# Patient Record
Sex: MALE | Race: BLACK OR AFRICAN AMERICAN
[De-identification: names, ages, dates, MRNs, and addresses within clinical notes are randomized per-mention and may not be internally consistent; named-entity substitution may affect disease eponyms.]

---

## 2019-03-01 ENCOUNTER — HOSPITAL ENCOUNTER (EMERGENCY)
Dept: HOSPITAL 72 - EMR | Age: 69
LOS: 1 days | Discharge: OTHER INSTITUTION | End: 2019-03-02
Payer: COMMERCIAL

## 2019-03-01 VITALS — DIASTOLIC BLOOD PRESSURE: 87 MMHG | SYSTOLIC BLOOD PRESSURE: 146 MMHG

## 2019-03-01 VITALS — SYSTOLIC BLOOD PRESSURE: 142 MMHG | DIASTOLIC BLOOD PRESSURE: 74 MMHG

## 2019-03-01 VITALS — DIASTOLIC BLOOD PRESSURE: 78 MMHG | SYSTOLIC BLOOD PRESSURE: 136 MMHG

## 2019-03-01 VITALS — BODY MASS INDEX: 25.9 KG/M2 | HEIGHT: 71 IN | WEIGHT: 185 LBS

## 2019-03-01 VITALS — SYSTOLIC BLOOD PRESSURE: 136 MMHG | DIASTOLIC BLOOD PRESSURE: 87 MMHG

## 2019-03-01 DIAGNOSIS — R42: ICD-10-CM

## 2019-03-01 DIAGNOSIS — R00.1: ICD-10-CM

## 2019-03-01 DIAGNOSIS — I10: ICD-10-CM

## 2019-03-01 DIAGNOSIS — R55: Primary | ICD-10-CM

## 2019-03-01 LAB
ADD MANUAL DIFF: NO
ALBUMIN SERPL-MCNC: 3.7 G/DL (ref 3.4–5)
ALBUMIN/GLOB SERPL: 1 {RATIO} (ref 1–2.7)
ALP SERPL-CCNC: 63 U/L (ref 46–116)
ALT SERPL-CCNC: 20 U/L (ref 12–78)
ANION GAP SERPL CALC-SCNC: 7 MMOL/L (ref 5–15)
APPEARANCE UR: CLEAR
APTT BLD: 27 SEC (ref 23–33)
APTT PPP: YELLOW S
AST SERPL-CCNC: 23 U/L (ref 15–37)
BASOPHILS NFR BLD AUTO: 1.7 % (ref 0–2)
BILIRUB DIRECT SERPL-MCNC: 0.2 MG/DL (ref 0–0.3)
BILIRUB SERPL-MCNC: 1.1 MG/DL (ref 0.2–1)
BUN SERPL-MCNC: 15 MG/DL (ref 7–18)
CALCIUM SERPL-MCNC: 9.2 MG/DL (ref 8.5–10.1)
CHLORIDE SERPL-SCNC: 103 MMOL/L (ref 98–107)
CO2 SERPL-SCNC: 31 MMOL/L (ref 21–32)
CREAT SERPL-MCNC: 1.2 MG/DL (ref 0.55–1.3)
EOSINOPHIL NFR BLD AUTO: 1.6 % (ref 0–3)
ERYTHROCYTE [DISTWIDTH] IN BLOOD BY AUTOMATED COUNT: 11.5 % (ref 11.6–14.8)
GLOBULIN SER-MCNC: 3.8 G/DL
GLUCOSE UR STRIP-MCNC: NEGATIVE MG/DL
HCT VFR BLD CALC: 43.1 % (ref 42–52)
HGB BLD-MCNC: 14.3 G/DL (ref 14.2–18)
INR PPP: 1 (ref 0.9–1.1)
KETONES UR QL STRIP: (no result)
LEUKOCYTE ESTERASE UR QL STRIP: (no result)
LYMPHOCYTES NFR BLD AUTO: 26.8 % (ref 20–45)
MCV RBC AUTO: 98 FL (ref 80–99)
MONOCYTES NFR BLD AUTO: 8.8 % (ref 1–10)
NEUTROPHILS NFR BLD AUTO: 61.1 % (ref 45–75)
NITRITE UR QL STRIP: NEGATIVE
PH UR STRIP: 5 [PH] (ref 4.5–8)
PLATELET # BLD: 239 K/UL (ref 150–450)
POTASSIUM SERPL-SCNC: 3.9 MMOL/L (ref 3.5–5.1)
PROT UR QL STRIP: NEGATIVE
RBC # BLD AUTO: 4.39 M/UL (ref 4.7–6.1)
SODIUM SERPL-SCNC: 140 MMOL/L (ref 136–145)
SP GR UR STRIP: 1.02 (ref 1–1.03)
UROBILINOGEN UR-MCNC: NORMAL MG/DL (ref 0–1)
WBC # BLD AUTO: 6.5 K/UL (ref 4.8–10.8)

## 2019-03-01 PROCEDURE — 99284 EMERGENCY DEPT VISIT MOD MDM: CPT

## 2019-03-01 PROCEDURE — 93005 ELECTROCARDIOGRAM TRACING: CPT

## 2019-03-01 PROCEDURE — 72131 CT LUMBAR SPINE W/O DYE: CPT

## 2019-03-01 PROCEDURE — 80053 COMPREHEN METABOLIC PANEL: CPT

## 2019-03-01 PROCEDURE — 83880 ASSAY OF NATRIURETIC PEPTIDE: CPT

## 2019-03-01 PROCEDURE — 82248 BILIRUBIN DIRECT: CPT

## 2019-03-01 PROCEDURE — 85025 COMPLETE CBC W/AUTO DIFF WBC: CPT

## 2019-03-01 PROCEDURE — 71045 X-RAY EXAM CHEST 1 VIEW: CPT

## 2019-03-01 PROCEDURE — 81003 URINALYSIS AUTO W/O SCOPE: CPT

## 2019-03-01 PROCEDURE — 36415 COLL VENOUS BLD VENIPUNCTURE: CPT

## 2019-03-01 PROCEDURE — 70450 CT HEAD/BRAIN W/O DYE: CPT

## 2019-03-01 PROCEDURE — 85610 PROTHROMBIN TIME: CPT

## 2019-03-01 PROCEDURE — 84484 ASSAY OF TROPONIN QUANT: CPT

## 2019-03-01 PROCEDURE — 85730 THROMBOPLASTIN TIME PARTIAL: CPT

## 2019-03-01 NOTE — NUR
ED Nurse Note:



Pt present at ER c/o back pain 7/10 which is chronic. pt aao x4 and skin dry 
but intact. calm and cooperative.

## 2019-03-01 NOTE — NUR
ED Nurse Note:





received report and endorsed care, pt vss, sinus anita on cardiac monitor, resp 
even and unlabored on RA, will cont monitor. verified w/ ERMD regarding pt 
eating, ERMD stated okay, pt given sandwich and juice. pt tolerated well. 
denies dizziness or weakness at this time.

## 2019-03-01 NOTE — DIAGNOSTIC IMAGING REPORT
EXAM:

  CT Lumbar Spine Without Intravenous Contrast

 

CLINICAL HISTORY:

  PAIN

 

TECHNIQUE:

  Axial computed tomography images of the lumbar spine without 

intravenous contrast.  CTDI is 15.73+0.25 mGy and DLP is 528 mGy-cm.  One 

or more of the following dose reduction techniques were used: automated 

exposure control, adjustment of the mA and/or kV according to patient 

size, use of iterative reconstruction technique.

 

COMPARISON:

  No relevant prior studies available.

 

FINDINGS:

  Vertebrae:  No fracture or malalignment.

  Discs/spinal canal/neural foramina:  Degenerative changes.

  Soft tissues:  Unremarkable.

 

IMPRESSION:     

  No fracture or malalignment.

## 2019-03-01 NOTE — EMERGENCY ROOM REPORT
History of Present Illness


General


Chief Complaint:  Back Pain-No Injury


Source:  Patient


 (Danny Live)





Present Illness


HPI


68-year-old male patient presents the ER complaining of lower back fullness 

that is radiating up his back to the base of his skull.  Denies pain.  Reports 

fullness is radiating down his legs.  States pain has been present for the past 

year however has increased in intensity in the past week.  Denies recent travel 

outside the country..  Denies history of heart disease, reports history of high 

blood pressure denies fever, chest pain or shortness of breath.  Denies 

abdominal pain.  Denies history of IV drug use.e states well controlled.  

Denies bowel or bladder incontinence.  Reports has not taken medication for the 

past year for relief of symptoms.  Reports that he was seen by primary care 

year ago and was told to go to physical therapy however states that he did not 

want to go.  Denies other aggravating or relieving factors.


 (Danny Live)


Allergies:  


Coded Allergies:  


     No Known Allergies (Unverified , 3/1/19)





Patient History


Past Medical History:  see triage record


Reviewed Nursing Documentation:  PMH: Agreed; PSxH: Agreed (Danny Live)





Nursing Documentation-PMH


Past Medical History:  No History, Except For


Hx Hypertension:  Yes


 (Danny Live)





Review of Systems


All Other Systems:  negative except mentioned in HPI


 (Danny Live)





Physical Exam





Vital Signs








  Date Time  Temp Pulse Resp B/P (MAP) Pulse Ox O2 Delivery O2 Flow Rate FiO2


 


3/1/19 18:31 98.2 51 16 142/74 96 Room Air  








Sp02 EP Interpretation:  reviewed, normal


General Appearance:  well appearing, no apparent distress, alert, GCS 15, non-

toxic


Head:  normocephalic, atraumatic


Eyes:  bilateral eye normal inspection, bilateral eye PERRL


ENT:  hearing grossly normal, normal pharynx, no angioedema, normal voice, 

uvula midline, moist mucus membranes


Neck:  full range of motion, no meningismus, no bony tend


Respiratory:  lungs clear, normal breath sounds, no rhonchi, no respiratory 

distress, no accessory muscle use, no wheezing, speaking full sentences


Cardiovascular #1:  regular rate, rhythm, no edema


Gastrointestinal:  non tender, soft, no mass, non-distended, no guarding, no 

rebound


Genitourinary:  no CVA tenderness


Musculoskeletal:  back normal, digits/nails normal, gait/station normal, normal 

range of motion, non-tender


Neurologic:  alert, oriented x3, responsive, CNs III-XII nml as tested, motor 

strength/tone normal, SLR negative, sensory intact, cerebellar normal, normal 

gait, speech normal


Psychiatric:  mood/affect normal


Skin:  no rash


Lymphatic:  no adenopathy


 (Danny Live)





Medical Decision Making


PA Attestation


Dr. Naik is my supervising Physician whom patient management has been 

discussed with.


 (Danny Live)


Medicare Attestation


The history of Dex Polk has been reviewed and management options for him 

have been examined and discussed by Kalpana Naik. I have personally examined 

and interviewed the patient.








Patient also complains of some dizziness and lightheadedness,, doesn't appear 

comfortable I discussed the case with the patient and his brother at bedside, 

given the symptomatically bradycardia and his discomfort patient will be 

admitted for further care,


 (Kalpana Naik DO)


Diagnostic Impression:  


 Primary Impression:  


 Near syncope


 Additional Impressions:  


 Bradycardia


 Dizziness


ER Course


Pt. presents to the ED c/o low back pressure, and feeling faint.





Ddx considered but are not limited to sprain, strain, CHF, MI, electrolyte 

abnormality, anemia, acute exacerbation of chronic pain.


Denies bowel or bladder incontinence, does not require MRI at this time, low 

suspicion for cauda equina.


Negative Kernig, negative Brudzinski, afebrile, low suspicion for meningitis.





Vital signs: are WNL, pt. is afebrile





ER COURSE:


On initial exam patient complaining of pressure in spine, and requesting MRI, 

informed patient cannot perform MRI because there are no clinical indications 

at that time however would perform CT of lumbar spine to rule out fracture or 

any other disease.   CT lumbar spine was negative, provide patient with copy of 

report.





On repeat exam patient states that he came in today not because of continued 

complaints of pressure in his spine however came into the ER because he was 

experiencing some feelings of fainting and leg fullness.  Denies history of CHF 

or heart disease.  Denies chest pain, shortness of breath, abdominal pain.  

Will order cardiac labs and EKG and CT head.





EKG shows sinus bradycardia, no ST elevations, troponin negative, low suspicion 

for MI.


CBC and CMP unremarkable, no elevation in LFTs or WBCs.  BNP not elevated chest 

x-ray unremarkable, low suspicion for CHF.  Troponin negative.


Coag studies unremarkable.


CT head negative.





Patient seen and evaluated by Dr. Naik. Will admit patient for inpatient 

care.





- Please note that this Emergency Department Report was dictated using [employer] technology software, occasionally this can lead to 

erroneous entry secondary to interpretation by the dictation equipment.





Labs








Test


  3/1/19


19:09


 


White Blood Count


  6.5 K/UL


(4.8-10.8)


 


Red Blood Count


  4.39 M/UL


(4.70-6.10)


 


Hemoglobin


  14.3 G/DL


(14.2-18.0)


 


Hematocrit


  43.1 %


(42.0-52.0)


 


Mean Corpuscular Volume 98 FL (80-99) 


 


Mean Corpuscular Hemoglobin


  32.6 PG


(27.0-31.0)


 


Mean Corpuscular Hemoglobin


Concent 33.2 G/DL


(32.0-36.0)


 


Red Cell Distribution Width


  11.5 %


(11.6-14.8)


 


Platelet Count


  239 K/UL


(150-450)


 


Mean Platelet Volume


  6.4 FL


(6.5-10.1)


 


Neutrophils (%) (Auto)


  61.1 %


(45.0-75.0)


 


Lymphocytes (%) (Auto)


  26.8 %


(20.0-45.0)


 


Monocytes (%) (Auto)


  8.8 %


(1.0-10.0)


 


Eosinophils (%) (Auto)


  1.6 %


(0.0-3.0)


 


Basophils (%) (Auto)


  1.7 %


(0.0-2.0)


 


Prothrombin Time


  11.0 SEC


(9.30-11.50)


 


Prothromb Time International


Ratio 1.0 (0.9-1.1) 


 


 


Activated Partial


Thromboplast Time 27 SEC (23-33) 


 


 


Sodium Level


  140 MMOL/L


(136-145)


 


Potassium Level


  3.9 MMOL/L


(3.5-5.1)


 


Chloride Level


  103 MMOL/L


()


 


Carbon Dioxide Level


  31 MMOL/L


(21-32)


 


Anion Gap


  7 mmol/L


(5-15)


 


Blood Urea Nitrogen


  15 mg/dL


(7-18)


 


Creatinine


  1.2 MG/DL


(0.55-1.30)


 


Estimat Glomerular Filtration


Rate > 60 mL/min


(>60)


 


Glucose Level


  104 MG/DL


()


 


Calcium Level


  9.2 MG/DL


(8.5-10.1)


 


Total Bilirubin


  1.1 MG/DL


(0.2-1.0)


 


Direct Bilirubin


  0.2 MG/DL


(0.0-0.3)


 


Aspartate Amino Transf


(AST/SGOT) 23 U/L (15-37) 


 


 


Alanine Aminotransferase


(ALT/SGPT) 20 U/L (12-78) 


 


 


Alkaline Phosphatase


  63 U/L


()


 


Troponin I


  0.000 ng/mL


(0.000-0.056)


 


Pro-B-Type Natriuretic Peptide


  75 pg/mL


(0-125)


 


Total Protein


  7.5 G/DL


(6.4-8.2)


 


Albumin


  3.7 G/DL


(3.4-5.0)


 


Globulin 3.8 g/dL 


 


Albumin/Globulin Ratio 1.0 (1.0-2.7) 








 (Danny Live P.A.)


ER Course


Please see above notes.


Patient discussed with Dr. Walker and accepted for transfer at [facility].  Told 

needs ALS.


Patient ambulatory and eating without difficulty.


 (Rylan Pires MD)


EKG Diagnostic Results


Rate:  normal


Rhythm:  NSR


ST Segments:  no acute changes


ASA given to the pt in ED:  No


PA Scribe Text


Jesse Live PA-C


 (Danny Live P.A.)





Rhythm Strip Diag. Results


Rate:  51


Rhythm:  NSR, no PVC's, no ectopy


PA Scribe Text


Jesse Live PA-C


 (Danny Live P.A.)





Chest X-Ray Diagnostic Results


Chest X-Ray Diagnostic Results :  


   Chest X-Ray Ordered:  Yes


   # of Views/Limited/Complete:  1 View


   Indication:  Chest Pain


   EP Interpretation:  Yes


   PA Xray:  Interpretation reviewed, by supervising MD, and agrees with 

findings.


   Interpretation:  no consolidation, no effusion, no pneumothorax, no acute 

cardiopulmonary disease, other - Chronic interstitial changes bilaterally


   Impression:  No acute disease


   PA Scribe Text


Jesse Live PA-C


 (Danny Live P.A.)





CT/MRI/US Diagnostic Results


CT/MRI/US Diagnostic Results #1:  


   Imaging Test Ordered:  CT lumbar spine


   Impression


No fracture or malalignment.


Degenerative changes noted.


CT/MRI/US Diagnostic Results #2:  


   Imaging Test Ordered:  CT head


   Impression


Unremarkable CT brain


 (Danny Live)





Last Vital Signs








  Date Time  Temp Pulse Resp B/P (MAP) Pulse Ox O2 Delivery O2 Flow Rate FiO2


 


3/1/19 18:31 98.2 51 16 142/74 96 Room Air  








 (Danny Live)





Last Vital Signs








  Date Time  Temp Pulse Resp B/P (MAP) Pulse Ox O2 Delivery O2 Flow Rate FiO2


 


3/2/19 06:41 98.6 61 14 145/65 96 Room Air  








Status:  improved


 (Rylan Pires MD)


[facility]position[facility]  [facility]


Condition:  Serious











Danny Live Mar 1, 2019 18:49


Kalpana Naik DO Mar 1, 2019 21:10


Rylan Pires MD Mar 2, 2019 01:53

## 2019-03-01 NOTE — NUR
ED Nurse Note:



pt was taken to CT scan. per ERPA it is ok for pt to go before carrying any 
other orders. CT scan is the most urgent.

## 2019-03-02 VITALS — DIASTOLIC BLOOD PRESSURE: 65 MMHG | SYSTOLIC BLOOD PRESSURE: 145 MMHG

## 2019-03-02 VITALS — DIASTOLIC BLOOD PRESSURE: 69 MMHG | SYSTOLIC BLOOD PRESSURE: 134 MMHG

## 2019-03-02 VITALS — DIASTOLIC BLOOD PRESSURE: 78 MMHG | SYSTOLIC BLOOD PRESSURE: 136 MMHG

## 2019-03-02 VITALS — SYSTOLIC BLOOD PRESSURE: 145 MMHG | DIASTOLIC BLOOD PRESSURE: 65 MMHG

## 2019-03-02 NOTE — NUR
ED Nurse Note:



Patient being transferred to [facility]. Patient being transferred by [facility] 
#[id number]. Patient sent with IV access. Patient report given to Lc at [facility]. 
Patient AOx4, VSS, ambulatory with steady gait, no s/s of acute distress noted.

## 2019-03-02 NOTE — NUR
ED Nurse Note:



REPORT GIVEN TO BAL MOMIN AT [facility], ETA AMBULANCE AT 0500

## 2019-03-02 NOTE — NUR
HANDOFF:



REport given to RN caren and endorsed care, pt VSS, sinus anita on cardiac 
monitor, resp even and unlabored on RA, will cont monitor.

## 2019-07-12 ENCOUNTER — HOSPITAL ENCOUNTER (EMERGENCY)
Dept: HOSPITAL 72 - EMR | Age: 69
Discharge: HOME | End: 2019-07-12
Payer: MEDICARE

## 2019-07-12 VITALS — SYSTOLIC BLOOD PRESSURE: 164 MMHG | DIASTOLIC BLOOD PRESSURE: 80 MMHG

## 2019-07-12 VITALS
HEIGHT: 71 IN | BODY MASS INDEX: 23.8 KG/M2 | SYSTOLIC BLOOD PRESSURE: 164 MMHG | WEIGHT: 170 LBS | DIASTOLIC BLOOD PRESSURE: 80 MMHG

## 2019-07-12 DIAGNOSIS — M50.30: Primary | ICD-10-CM

## 2019-07-12 PROCEDURE — 99283 EMERGENCY DEPT VISIT LOW MDM: CPT

## 2019-07-12 NOTE — EMERGENCY ROOM REPORT
History of Present Illness


General


Chief Complaint:  Pain


Source:  Patient





Present Illness


HPI


This is a 69-year-old male with a history of high blood pressure.  He presents 

with chief complaint of neck pressure.  Onset for at least 5 months.  He had a 

MRI of his neck and it shows stenosis around the C7 area.  He said he has a lot 

of pressure over that area.  It radiates to her ribs and neck and head.  So 

severe that he said he felt like he was in a pass out.  He called 911.  This is 

a chronic issue.  He said he been to multiple ER for this.  He refused pain 

medication.  He is want something for "relieve the pressure."  Edge of her 

surgery in September.  No focal deficit.  No weakness.  No fever chills.  No 

incontinence of bowel or urine.  Denies any other complaint.


Allergies:  


Coded Allergies:  


     No Known Allergies (Unverified , 3/1/19)





Patient History


Past Medical History:  see triage record, old chart reviewed, HTN


Past Surgical History:  none


Pertinent Family History:  none


Social History:  Denies: smoking


Immunizations:  other


Reviewed Nursing Documentation:  PMH: Agreed; PSxH: Agreed





Nursing Documentation-PMH


Hx Hypertension:  Yes





Review of Systems


Eye:  Denies: eye pain, blurred vision


ENT:  Denies: ear pain, nose congestion, throat swelling


Respiratory:  Denies: cough, shortness of breath


Cardiovascular:  Denies: chest pain, palpitations


Gastrointestinal:  Denies: abdominal pain, diarrhea, nausea, vomiting


Musculoskeletal:  Denies: back pain, joint pain


Skin:  Denies: rash


Neurological:  Denies: headache, numbness


Endocrine:  Denies: increased thirst, increased urine


Hematologic/Lymphatic:  Denies: easy bruising


All Other Systems:  negative except mentioned in HPI





Physical Exam





Vital Signs








  Date Time  Temp Pulse Resp B/P (MAP) Pulse Ox O2 Delivery O2 Flow Rate FiO2


 


7/12/19 02:41 98.2 86 16 164/80 (108) 99 Room Air  





Vitals with high blood pressure


Sp02 EP Interpretation:  reviewed, normal


General Appearance:  well appearing, no apparent distress, alert


Head:  normocephalic, atraumatic


Eyes:  bilateral eye PERRL, bilateral eye EOMI


ENT:  hearing grossly normal, normal pharynx


Neck:  full range of motion, supple, no meningismus


Respiratory:  chest non-tender, lungs clear, normal breath sounds


Cardiovascular #1:  regular rate, rhythm, no murmur


Gastrointestinal:  normal bowel sounds, non tender, no mass, no organomegaly, 

no bruit, non-distended


Musculoskeletal:  back normal, gait/station normal, normal range of motion


Psychiatric:  mood/affect normal





Medical Decision Making


Diagnostic Impression:  


 Primary Impression:  


 Degenerative disc disease, cervical


ER Course


Patient presents with degenerative disc disease with stenosis.  I suspect there 

is a high psychogenic/anxiety component to this.  There is no evidence of cauda 

equina syndrome, spinal epidural abscess or neoplastic process.  Will discharge 

home.





Last Vital Signs








  Date Time  Temp Pulse Resp B/P (MAP) Pulse Ox O2 Delivery O2 Flow Rate FiO2


 


7/12/19 02:41 98.2 86 16 164/80 (108) 99 Room Air  








Status:  improved


Disposition:  HOME, SELF-CARE


Condition:  Stable


Scripts


Prednisone* (PREDNISONE*) 20 Mg Tablet


40 MG ORAL DAILY, #8 TAB


   Prov: Cameron Ortiz MD         7/12/19 


Ibuprofen* (MOTRIN*) 600 Mg Tablet


600 MG ORAL THREE TIMES A DAY, #30 TAB 0 Refills


   Prov: Cameron Ortiz MD         7/12/19





Additional Instructions:  


Follow-up with your doctor in a week.  Return if symptoms worsen.











Cameron Ortiz MD Jul 12, 2019 03:25

## 2019-07-12 NOTE — NUR
ED Nurse Note:

Pt BIBA from home c/o chronic 10/10 neck pain x5 months, pt has hx of spinal 
stenosis. Pt is A&Ox4, VSS

## 2020-03-17 ENCOUNTER — HOSPITAL ENCOUNTER (EMERGENCY)
Dept: HOSPITAL 72 - EMR | Age: 70
Discharge: HOME | End: 2020-03-17
Payer: MEDICARE

## 2020-03-17 VITALS — SYSTOLIC BLOOD PRESSURE: 163 MMHG | DIASTOLIC BLOOD PRESSURE: 72 MMHG

## 2020-03-17 VITALS — BODY MASS INDEX: 28.98 KG/M2 | WEIGHT: 207 LBS | HEIGHT: 71 IN

## 2020-03-17 VITALS — DIASTOLIC BLOOD PRESSURE: 88 MMHG | SYSTOLIC BLOOD PRESSURE: 169 MMHG

## 2020-03-17 DIAGNOSIS — M54.6: Primary | ICD-10-CM

## 2020-03-17 DIAGNOSIS — I10: ICD-10-CM

## 2020-03-17 DIAGNOSIS — G89.29: ICD-10-CM

## 2020-03-17 LAB
ADD MANUAL DIFF: NO
ALBUMIN SERPL-MCNC: 3.5 G/DL (ref 3.4–5)
ALBUMIN/GLOB SERPL: 1 {RATIO} (ref 1–2.7)
ALP SERPL-CCNC: 62 U/L (ref 46–116)
ALT SERPL-CCNC: 25 U/L (ref 12–78)
ANION GAP SERPL CALC-SCNC: 7 MMOL/L (ref 5–15)
AST SERPL-CCNC: 17 U/L (ref 15–37)
BASOPHILS NFR BLD AUTO: 1.9 % (ref 0–2)
BILIRUB SERPL-MCNC: 0.8 MG/DL (ref 0.2–1)
BUN SERPL-MCNC: 14 MG/DL (ref 7–18)
CALCIUM SERPL-MCNC: 9.5 MG/DL (ref 8.5–10.1)
CHLORIDE SERPL-SCNC: 109 MMOL/L (ref 98–107)
CO2 SERPL-SCNC: 33 MMOL/L (ref 21–32)
CREAT SERPL-MCNC: 1.1 MG/DL (ref 0.55–1.3)
EOSINOPHIL NFR BLD AUTO: 2.3 % (ref 0–3)
ERYTHROCYTE [DISTWIDTH] IN BLOOD BY AUTOMATED COUNT: 12.7 % (ref 11.6–14.8)
GLOBULIN SER-MCNC: 3.5 G/DL
HCT VFR BLD CALC: 44.7 % (ref 42–52)
HGB BLD-MCNC: 14.5 G/DL (ref 14.2–18)
LYMPHOCYTES NFR BLD AUTO: 23.6 % (ref 20–45)
MCV RBC AUTO: 98 FL (ref 80–99)
MONOCYTES NFR BLD AUTO: 7 % (ref 1–10)
NEUTROPHILS NFR BLD AUTO: 65.2 % (ref 45–75)
PLATELET # BLD: 226 K/UL (ref 150–450)
POTASSIUM SERPL-SCNC: 4.2 MMOL/L (ref 3.5–5.1)
RBC # BLD AUTO: 4.54 M/UL (ref 4.7–6.1)
SODIUM SERPL-SCNC: 149 MMOL/L (ref 136–145)
WBC # BLD AUTO: 7 K/UL (ref 4.8–10.8)

## 2020-03-17 PROCEDURE — 80053 COMPREHEN METABOLIC PANEL: CPT

## 2020-03-17 PROCEDURE — 99284 EMERGENCY DEPT VISIT MOD MDM: CPT

## 2020-03-17 PROCEDURE — 85025 COMPLETE CBC W/AUTO DIFF WBC: CPT

## 2020-03-17 PROCEDURE — 96374 THER/PROPH/DIAG INJ IV PUSH: CPT

## 2020-03-17 PROCEDURE — 36415 COLL VENOUS BLD VENIPUNCTURE: CPT

## 2020-03-17 NOTE — NUR
ER DISCHARGE NOTE:

Patient is cleared to be discharged per ERMD, pt is aox4, on room air, with 
stable vital signs. pt was given dc instructions, pt was able to verbalize 
understanding, pt id band and iv site removed without complications. pt is able 
to ambulate with steady gait. pt took all belongings.

## 2020-03-17 NOTE — NUR
ED Nurse Note:



PT BIBA RA 68 FROM HOME C/O BACK PRESSURE THAT RADIATES TO CHEST BUT DENIES 
PAIN. PT STATES HX SPINAL STENOSIS AND HTN. /106 AT TRIAGE, OTHERWISE 
VSS, NAD. WILL CONTINUE TO MONITOR PATIENT.

## 2020-03-17 NOTE — EMERGENCY ROOM REPORT
History of Present Illness


General


Chief Complaint:  Back Pain-No Injury


Source:  Patient





Present Illness


HPI


69-year-old male presents the ED for evaluation.  Brought in by EMS from home.  

Complaining of "pressure" in his back.  History of spinal stenosis.  States he 

had surgery on his back about 7 months ago.  Believes it was a spinal fusion.  

States since then he has had continued pressure sensation in his upper back.  

Has been to multiple ERs for evaluation of this.  Has had recent MRIs which 

have been unremarkable.  Denies pain.  States he is able to walk.  States he 

has been trying to get an appointment with a surgeon for several months now.  

denies Bowel or bladder incontinence.  No other aggravating relieving factors.  

Denies any other associated symptoms


COVID-19 risk:Travel to affect:  No


Has patient experienced corona:  No


Allergies:  


Coded Allergies:  


     No Known Allergies (Unverified , 3/1/19)





Patient History


Past Medical History:  HTN


Past Surgical History:  none


Pertinent Family History:  none


Social History:  Denies: smoking, alcohol use, drug use


Immunizations:  UTD


Reviewed Nursing Documentation:  PMH: Agreed; PSxH: Agreed





Nursing Documentation-PMH


Hx Hypertension:  Yes





Review of Systems


All Other Systems:  negative except mentioned in HPI





Physical Exam





Vital Signs








  Date Time  Temp Pulse Resp B/P (MAP) Pulse Ox O2 Delivery O2 Flow Rate FiO2


 


3/17/20 19:12 99.0 77 18 221/106 (144) 98 Room Air  








Sp02 EP Interpretation:  reviewed, normal


General Appearance:  no apparent distress, alert, GCS 15, non-toxic


Head:  normocephalic, atraumatic


Eyes:  bilateral eye normal inspection, bilateral eye PERRL


ENT:  hearing grossly normal, normal pharynx, no angioedema, normal voice


Neck:  full range of motion, supple/symm/no masses


Respiratory:  chest non-tender, lungs clear, normal breath sounds, speaking 

full sentences


Cardiovascular #1:  regular rate, rhythm, no edema


Cardiovascular #2:  2+ carotid (R), 2+ carotid (L), 2+ radial (R), 2+ radial (L)

, 2+ dorsalis pedis (R), 2+ dorsalis pedis (L)


Gastrointestinal:  normal bowel sounds, non tender, soft, non-distended, no 

guarding, no rebound


Rectal:  deferred


Genitourinary:  normal inspection, no CVA tenderness


Musculoskeletal:  back normal, normal range of motion, gait/station normal, non-

tender


Neurologic:  alert, motor strength/tone normal, oriented x3, sensory intact, 

responsive, speech normal, other - 5/5 strength lower extremities


Psychiatric:  judgement/insight normal, memory normal, mood/affect normal, no 

suicidal/homicidal ideation


Reflexes:  3+ bicep (R), 3+ bicep (L), 3+ tricep (R), 3+ tricep (L), 3+ knee (R)

, 3+ knee (L)


Lymphatic:  no adenopathy





Medical Decision Making


Diagnostic Impression:  


 Primary Impression:  


 Back pain


 Qualified Codes:  M54.6 - Pain in thoracic spine; G89.29 - Other chronic pain


ER Course


Hospital Course 


70 yo M presents with pressure in upper back. h/o back surgery. 





Differential diagnoses include: muscle strain, fx, chronic pain 





Clinical course


Patient placed on stretcher.  After initial history illness elderly male in no 

acute distress.  There is no midline tenderness.  5 out of 5 strength in lower 

extremities.  And in upper extremities.  Sensory intact.





I contacted [facility].  Stated that patient would benefit from either 

observation or prompt follow-up with his PMD and surgeon as he has been 

attempting for several months to get appointments.  [facility] stated that 

they would facilitate appointments for the patient this week.  I agree that 

patient can be discharged home at this time.





I discussed findings with patient.  He agrees with plan.  Safe for discharge 

with close outpatient follow-up





Diagnosis - back pain 





Stable and discharged to home.  Followup with PMD.  Return to ED if symptoms 

recur or worsen





Last Vital Signs








  Date Time  Temp Pulse Resp B/P (MAP) Pulse Ox O2 Delivery O2 Flow Rate FiO2


 


3/17/20 20:32 98.6 71 18 163/72 98 Room Air  








Status:  improved


Disposition:  HOME, SELF-CARE


Condition:  Stable


Referrals:  


[facility] (PCP)


Patient Instructions:  Back Pain, Adult











John Gerard MD Mar 17, 2020 21:53

## 2020-04-24 ENCOUNTER — HOSPITAL ENCOUNTER (EMERGENCY)
Dept: HOSPITAL 72 - EMR | Age: 70
Discharge: HOME | End: 2020-04-24
Payer: COMMERCIAL

## 2020-04-24 VITALS — DIASTOLIC BLOOD PRESSURE: 59 MMHG | SYSTOLIC BLOOD PRESSURE: 122 MMHG

## 2020-04-24 VITALS — SYSTOLIC BLOOD PRESSURE: 122 MMHG | DIASTOLIC BLOOD PRESSURE: 59 MMHG

## 2020-04-24 VITALS — HEIGHT: 69 IN | BODY MASS INDEX: 26.66 KG/M2 | WEIGHT: 180 LBS

## 2020-04-24 DIAGNOSIS — M54.5: Primary | ICD-10-CM

## 2020-04-24 DIAGNOSIS — I10: ICD-10-CM

## 2020-04-24 DIAGNOSIS — G89.29: ICD-10-CM

## 2020-04-24 DIAGNOSIS — M47.9: ICD-10-CM

## 2020-04-24 PROCEDURE — 99284 EMERGENCY DEPT VISIT MOD MDM: CPT

## 2020-04-24 PROCEDURE — 96375 TX/PRO/DX INJ NEW DRUG ADDON: CPT

## 2020-04-24 PROCEDURE — 74176 CT ABD & PELVIS W/O CONTRAST: CPT

## 2020-04-24 PROCEDURE — 96374 THER/PROPH/DIAG INJ IV PUSH: CPT

## 2020-04-24 NOTE — DIAGNOSTIC IMAGING REPORT
Indication: Abdominal pain, acute worsening of back pain

 

Technique: Spiral acquisitions obtained through the abdomen and pelvis. No oral

contrast utilized, per emergency room physician request No IV contrast utilized,  per

emergency room physician request.. Multiplanar reconstructions were generated. Total

dose length product 586 mGycm. CTDIvol(s) 9 mGy. Dose reduction achieved using

automated exposure control

 

 

Comparison: None

 

Findings: Lack of enteric contrast limits assessment of the GI tract. Normal

appendix. No evidence of colonic diverticulosis or diverticulitis. No small bowel

distention no free or loculated intraperitoneal gas or fluid. Distal esophagus,

stomach, duodenum are unremarkable. There is a small fat-containing umbilical hernia.

There are small fat-containing bilateral inguinal hernias.

 

Lack of IV contrast limits assessment of the solid organs. The liver, gallbladder,

bile ducts, pancreas, spleen, adrenals, right kidney are all unremarkable. The left

kidney demonstrates a punctate upper pole peripheral parenchymal. No pelvic mass or

adenopathy. No retroperitoneal or mesenteric mass or adenopathy.

 

The bones demonstrate mild degenerative spondylosis changes. There are degenerative

changes of both hips. The included lung bases demonstrate posterior dependent

atelectatic changes. The heart is borderline enlarged.

 

Impression: Limited assessment of the GI tract, due to lack of enteric contrast

administration

 

No definite acute abnormality

 

Borderline cardiomegaly

 

Mild degenerative spondylosis changes

 

Incidental findings noted, including bilateral hip degenerative changes, posterior

dependent pulmonary atelectatic changes, left upper pole renal parenchymal

calcification, small umbilical and inguinal hernias containing only fat

 

The CT scanner at [facility] is accredited by the American College of

Radiology and the scans are performed using protocols designed to limit radiation

exposure to as low as reasonably achievable to attain images of sufficient resolution

adequate for diagnostic evaluation.

## 2020-04-24 NOTE — EMERGENCY ROOM REPORT
History of Present Illness


General


Chief Complaint:  Back Pain-No Injury


Source:  Patient, EMS





Present Illness


HPI


69 year old male pmhx of spinal stenosis, presents with 14 months of back pain, 

reports that it acutely worsened today, alleviated by lying on back, worsened 

by activity, and standing up right patient reports that he is following up with 

Gui Ortiz MD neurosurgery, however he was lost to follow-up. He denies any 

weakness, bowel bladder retention, perianal numbness. Patient presents for 

evaluation and pain control. No f/c, cough, congestion, cp, abdominal pain.


Allergies:  


Coded Allergies:  


     No Known Allergies (Unverified , 3/1/19)





COVID-19 Screening


Contact w/high risk pt:  No


Recent Travel to affected area:  No


Experienced COVID-19 symptoms?:  No





Patient History


Past Medical History:  see triage record


Reviewed Nursing Documentation:  PMH: Agreed; PSxH: Agreed





Nursing Documentation-PM


Past Medical History:  No History, Except For


Hx Hypertension:  Yes





Review of Systems


All Other Systems:  negative except mentioned in HPI





Physical Exam





Vital Signs








  Date Time  Temp Pulse Resp B/P (MAP) Pulse Ox O2 Delivery O2 Flow Rate FiO2


 


4/24/20 12:22 97.2 72 15 122/59 (80) 99 Room Air  








General Appearance:  well appearing, no apparent distress


Head:  normocephalic, atraumatic


ENT:  hearing grossly normal, normal voice


Neck:  full range of motion, supple


Respiratory:  no respiratory distress, speaking full sentences


Musculoskeletal:  other - There is to palpation mid lower back L3-L4-L5, 5 out 

of 5 strength bilaterally ankle knee, hip, 2+ patellar reflexes bilaterally, 

sensation grossly intact bilaterally.  Back: No step-offs


Neurologic:  alert, normal gait


Psychiatric:  mood/affect normal


Skin:  no rash





Medical Decision Making


Diagnostic Impression:  


 Primary Impression:  


 Back pain


 Qualified Codes:  M54.5 - Low back pain; G89.29 - Other chronic pain


ER Course


The patient presents with acute on chronic back pain. Clinically this patient 

can be ruled out for serious pathology given there is a completely normal 

neurological exam, no history of IV drug use, and no history of bowel or 

bladder incontinence, no perianal numbness/tingling, no constipation or urinary 

retention. Once the patient's pain was adequately controlled, the patient was 

able to ambulate and be discharged in stable condition with anticipatory 

guidance provided.





Patient was counseled on how to utilize this year care plan to find a PCP 

coordinate with the PCP and get a referral to a neuro surgeon





Strict return precautions were discussed, pain regimen was provided disposition 

home with return precautions


CT/MRI/US Diagnostic Results


CT/MRI/US Diagnostic Results :  


   Impression





Procedure: CT Abdomen Pelvis WO Contrast


Indication: Abdominal pain, acute worsening of back pain


 


Technique: Spiral acquisitions obtained through the abdomen and pelvis. No oral


contrast utilized, per emergency room physician request No IV contrast utilized

,  per


emergency room physician request.. Multiplanar reconstructions were generated. 

Total


dose length product 586 mGycm. CTDIvol(s) 9 mGy. Dose reduction achieved using


automated exposure control


 


 


Comparison: None


 


Findings: Lack of enteric contrast limits assessment of the GI tract. Normal


appendix. No evidence of colonic diverticulosis or diverticulitis. No small 

bowel


distention no free or loculated intraperitoneal gas or fluid. Distal esophagus,


stomach, duodenum are unremarkable. There is a small fat-containing umbilical 

hernia.


There are small fat-containing bilateral inguinal hernias.


 


Lack of IV contrast limits assessment of the solid organs. The liver, 

gallbladder,


bile ducts, pancreas, spleen, adrenals, right kidney are all unremarkable. The 

left


kidney demonstrates a punctate upper pole peripheral parenchymal. No pelvic 

mass or


adenopathy. No retroperitoneal or mesenteric mass or adenopathy.


 


The bones demonstrate mild degenerative spondylosis changes. There are 

degenerative


changes of both hips. The included lung bases demonstrate posterior dependent


atelectatic changes. The heart is borderline enlarged.


 


Impression: Limited assessment of the GI tract, due to lack of enteric contrast


administration


 


No definite acute abnormality


 


Borderline cardiomegaly


 


Mild degenerative spondylosis changes


 


Incidental findings noted, including bilateral hip degenerative changes, 

posterior


dependent pulmonary atelectatic changes, left upper pole renal parenchymal


calcification, small umbilical and inguinal hernias containing only fat


 


The CT scanner at [facility] is accredited by the [employer] 

of


Radiology and the scans are performed using protocols designed to limit 

radiation


exposure to as low as reasonably achievable to attain images of sufficient 

resolution


adequate for diagnostic evaluation.


 














Dictated By: Nicholas Cummins MD   


Electronically Signed By: Nicholas Cummins MD   


Signed Date/Time 04/24/20 7540   








CC: Edward Velasquez MD





Last Vital Signs








  Date Time  Temp Pulse Resp B/P (MAP) Pulse Ox O2 Delivery O2 Flow Rate FiO2


 


4/24/20 12:22 97.2 72 15 122/59 (80) 99 Room Air  








Disposition:  HOME, SELF-CARE


Condition:  Stable


Scripts


Lidocaine Patch* (Lidoderm Patch*) 1 Each Adh..patch


1 PATCH TOPIC DAILY PRN for For Pain, #7 PATCH 0 Refills


   Patch(es) may remain in place for up to 12 hours in any 24-hour


   period.


   Prov: Edward Velasquez MD         4/24/20 


Ibuprofen* (MOTRIN*) 600 Mg Tablet


600 MG ORAL Q8H PRN for FOR PAIN, #30 TAB 0 Refills


   Prov: Edward Velasquez MD         4/24/20 


Acetaminophen (Tylenol) 325 Mg Tablet


650 MG ORAL Q6H PRN for Prn Pain/Headache/Temp > 101, #30 TAB 0 Refills


   Prov: Edward Velasquez MD         4/24/20


Referrals:  


[facility]





[facility]


Patient Instructions:  Back Pain, Adult





Additional Instructions:  


The patient was provided with discharge instructions, notified to follow-up 

with a primary care doctor and or specialist in the next 24-48 hours, and to 

return to the ED if they have worsening of their symptoms. 





Please note that this report is being documented using DRAGON technology.


This can lead to erroneous entry secondary to incorrect interpretation by the 

dictating instrument.











Edward Velasquez MD Apr 24, 2020 12:52

## 2020-04-24 NOTE — NUR
ED Nurse Note:



patient was brought in by ambulance from home due to chronic back pain; reports 
no recent injury. Reports no numnbess or tingling in BLE. Patient presented 
calm, cooperative, AAO x4, VSS at this time, skin is warm to touch, has 
non-labored breathing.